# Patient Record
Sex: MALE | Race: WHITE | Employment: UNEMPLOYED | URBAN - METROPOLITAN AREA
[De-identification: names, ages, dates, MRNs, and addresses within clinical notes are randomized per-mention and may not be internally consistent; named-entity substitution may affect disease eponyms.]

---
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
## 2024-01-13 ENCOUNTER — HOSPITAL ENCOUNTER (EMERGENCY)
Facility: HOSPITAL | Age: 19
Discharge: HOME | End: 2024-01-13
Payer: MEDICAID

## 2024-01-13 ENCOUNTER — APPOINTMENT (OUTPATIENT)
Facility: HOSPITAL | Age: 19
End: 2024-01-13
Payer: MEDICAID

## 2024-01-13 VITALS
TEMPERATURE: 97.9 F | SYSTOLIC BLOOD PRESSURE: 146 MMHG | WEIGHT: 315 LBS | DIASTOLIC BLOOD PRESSURE: 92 MMHG | HEART RATE: 92 BPM | OXYGEN SATURATION: 100 % | RESPIRATION RATE: 16 BRPM

## 2024-01-13 DIAGNOSIS — M25.532 ACUTE WRIST PAIN, LEFT: Primary | ICD-10-CM

## 2024-01-13 PROCEDURE — 73110 X-RAY EXAM OF WRIST: CPT

## 2024-01-13 PROCEDURE — 99283 EMERGENCY DEPT VISIT LOW MDM: CPT

## 2024-01-13 ASSESSMENT — PAIN SCALES - GENERAL: PAINLEVEL_OUTOF10: 6

## 2024-01-17 ENCOUNTER — OFFICE VISIT (OUTPATIENT)
Age: 19
End: 2024-01-17
Payer: MEDICAID

## 2024-01-17 VITALS
WEIGHT: 315 LBS | HEIGHT: 67 IN | SYSTOLIC BLOOD PRESSURE: 129 MMHG | DIASTOLIC BLOOD PRESSURE: 69 MMHG | HEART RATE: 86 BPM | RESPIRATION RATE: 18 BRPM | BODY MASS INDEX: 49.44 KG/M2 | TEMPERATURE: 97.5 F | OXYGEN SATURATION: 98 %

## 2024-01-17 DIAGNOSIS — M25.532 LEFT WRIST PAIN: Primary | ICD-10-CM

## 2024-01-17 PROCEDURE — 99204 OFFICE O/P NEW MOD 45 MIN: CPT | Performed by: PHYSICIAN ASSISTANT

## 2024-01-17 ASSESSMENT — PATIENT HEALTH QUESTIONNAIRE - PHQ9
SUM OF ALL RESPONSES TO PHQ QUESTIONS 1-9: 0
1. LITTLE INTEREST OR PLEASURE IN DOING THINGS: 0
SUM OF ALL RESPONSES TO PHQ QUESTIONS 1-9: 0

## 2024-01-17 NOTE — PROGRESS NOTES
Room: 1B  I have reviewed all needed documentation in preparation for visit. Verified patient by name and date of birth  Chief Complaint   Patient presents with    New Patient    Wrist Pain     Left        Vitals:    01/17/24 0944   BP: 129/69   Site: Left Upper Arm   Position: Sitting   Cuff Size: Large Adult   Pulse: 86   Resp: 18   Temp: 97.5 °F (36.4 °C)   TempSrc: Oral   SpO2: 98%   Weight: (!) 152 kg (335 lb)   Height: 1.702 m (5' 7\")       No LMP for male patient.    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 I have asked the patient to contact his/her primary care provider (PCP) for follow-up on his/her health maintenance.    \"Have you been to the ER, urgent care clinic since your last visit?  Hospitalized since your last visit?\"    NO    “Have you seen or consulted any other health care providers outside of [facility] since your last visit?”    NO      
        Review of Systems:       General: Denies headache, lethargy, fever, weight loss  Ears/Nose/Throat: Denies ear discharge, drainage, nosebleeds, hoarse voice, dental problems  Cardiovascular: Denies chest pain, shortness of breath  Lungs: Denies chest pain, breathing problems, wheezing, pneumonia  Stomach: Denies stomach pain, heartburn, constipation, irritable bowel  Skin: Denies rash, sores, open wounds  Musculoskeletal: left hand -improving  Genitourinary: Denies dysuria, hematuria, polyuria  Gastrointestinal: Denies constipation, obstipation, diarrhea  Neurological: Denies changes in sight, smell, hearing, taste, seizures. Denies loss of consciousness.  Psychiatric: Denies depression, sleep pattern changes, anxiety, change in personality  Endocrine: Denies mood swings, heat or cold intolerance  Hematologic/Lymphatic: Denies anemia, purpura, petechia  Allergic/Immunologic: Denies swelling of throat, pain or swelling at lymph nodes      Physical Examination:    Vitals:    01/17/24 0944   BP: 129/69   Pulse: 86   Resp: 18   Temp: 97.5 °F (36.4 °C)   SpO2: 98%        General: AOX3, no apparent distress  Psychiatric: mood and affect appropriate  Lungs: breathing is symmetric and unlabored bilaterally  Heart: regular rate and rhythm  Abdomen: no guarding  Head: normocephalic, atraumatic  Skin: No significant abnormalities, good turgor  Sensation intact to light touch: C5-T1 dermatomes  Muscular exam: 5/5 strength in all major muscle groups unless noted in specialty exam.    Extremities      Left upper extremity: No gross deformity, swelling or edema. There is mild pain with active flexion of the wrist, otherwise No restriction to range of motion of the shoulder, elbow, wrist. Mild tenderness of the flexor tendons.  No tenderness to palpation of the forearm, carpals, hand or digitst. He is able to fully extend, flex and abduct fingers. FDP and FDS tested and are intact.  Neck range of motion is full and pain

## 2024-01-19 NOTE — ED PROVIDER NOTES
[facility] EMERGENCY DEPT  EMERGENCY DEPARTMENT ENCOUNTER         Pt Name: Jose Sultana  MRN: [id number]  Birthdate 2005  Date of evaluation: 1/13/2024  Provider: Riddhi Goldstein PA-C   PCP: Josemanuel Viramontes DO  Note Started: 9:28 PM EST 1/18/24     CHIEF COMPLAINT       Chief Complaint   Patient presents with    Wrist Pain     Pain on left wrist since helping his dad install a heavy barn door on Thursday. He is able to move, but it it painful and stiff. He has had it elevated and taking ibuprofen . Sometimes he feels like a jolt of pain left wrist. Good PSM,        HISTORY OF PRESENT ILLNESS: 1 or more elements      History From: Patient  HPI Limitations: None     Jose Sultana is a 18 y.o. male who presents ambulatory to the emergency department for evaluation of left wrist pain.  Patient states pain began on Thursday after helping his dad \"have a barn door\".  Patient denies decreased mobility, just states that it hurts when he moves it.  Patient has been taking ibuprofen at home, patient is in a wrist brace on initial evaluation.     Nursing Notes were all reviewed and agreed with or any disagreements were addressed in the HPI.     REVIEW OF SYSTEMS      Review of Systems     Positives and Pertinent negatives as per HPI.    PAST HISTORY     Past Medical History:  Past Medical History:   Diagnosis Date    Asthma     Strep pharyngitis 05/04/2017    Strep pharyngitis 11/18/2016    Strep pharyngitis 09/28/2016       Past Surgical History:  Past Surgical History:   Procedure Laterality Date    HEENT      tubes in bilat. ears    ORTHOPEDIC SURGERY      left elbow    TYMPANOSTOMY TUBE PLACEMENT         Family History:  Family History   Problem Relation Age of Onset    Psychiatric Disorder Mother     Diabetes Maternal Grandfather     Crohn's Disease Father     Psychiatric Disorder Father         anxiety    No Known Problems Sister     Kidney Disease Maternal Grandmother     No Known Problems Brother        Social

## 2024-02-23 ENCOUNTER — OFFICE VISIT (OUTPATIENT)
Age: 19
End: 2024-02-23
Payer: MEDICAID

## 2024-02-23 VITALS — HEIGHT: 67 IN | BODY MASS INDEX: 52.46 KG/M2

## 2024-02-23 DIAGNOSIS — M25.532 LEFT WRIST PAIN: Primary | ICD-10-CM

## 2024-02-23 PROCEDURE — 99212 OFFICE O/P EST SF 10 MIN: CPT | Performed by: PHYSICIAN ASSISTANT

## 2024-02-23 ASSESSMENT — PATIENT HEALTH QUESTIONNAIRE - PHQ9
SUM OF ALL RESPONSES TO PHQ QUESTIONS 1-9: 0
1. LITTLE INTEREST OR PLEASURE IN DOING THINGS: 0
SUM OF ALL RESPONSES TO PHQ QUESTIONS 1-9: 0
SUM OF ALL RESPONSES TO PHQ9 QUESTIONS 1 & 2: 0
2. FEELING DOWN, DEPRESSED OR HOPELESS: 0

## 2024-02-23 NOTE — PROGRESS NOTES
Identified pt with two pt identifiers (name and ). Reviewed chart in preparation for visit and have obtained necessary documentation.    Jose Sultana is a 18 y.o. male Pain (Left Wrist Pain )  .    Vitals:    24 1001   Height: 1.702 m (5' 7.01\")          1. Have you been to the ER, urgent care clinic since your last visit?  Hospitalized since your last visit?  no     2. Have you seen or consulted any other health care providers outside of the [facility] since your last visit?  Include any pap smears or colon screening.  no

## 2024-02-23 NOTE — PROGRESS NOTES
2/23/2024      CC: Left wrist pain    HPI:      This is a 18 y.o. year old male who is following up for left wrist pain. 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  His treatments so far have consisted of ibuprofen occasionally and a wrist immobilizer.  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 He states his pain has been improving mildly since his last visit a month ago but he recognizes he may not have been resting enough.  He denies any numbness, tingling, weakness, swelling or bruising.  He localizes pain most prominently over the dorsal and ulnar aspect of the wrist.        PMH:  Past Medical History:   Diagnosis Date    Asthma     Strep pharyngitis 05/04/2017    Strep pharyngitis 11/18/2016    Strep pharyngitis 09/28/2016       PSxHx:  Past Surgical History:   Procedure Laterality Date    HEENT      tubes in bilat. ears    ORTHOPEDIC SURGERY      left elbow    TYMPANOSTOMY TUBE PLACEMENT         Meds:  No current outpatient medications on file.    All:  Allergies   Allergen Reactions    Codeine Other (See Comments)     Hyper stay awake       Social Hx:  Social History     Socioeconomic History    Marital status: Single     Spouse name: None    Number of children: None    Years of education: None    Highest education level: None   Tobacco Use    Smoking status: Never     Passive exposure: Yes    Smokeless tobacco: Never   Substance and Sexual Activity    Alcohol use: No    Drug use: No       Family Hx:  Family History   Problem Relation Age of Onset    Psychiatric Disorder Mother     Diabetes Maternal Grandfather     Crohn's Disease Father     Psychiatric Disorder Father         anxiety    No Known Problems Sister     Kidney Disease Maternal Grandmother     No Known Problems Brother          Review of Systems:

## 2024-03-22 ENCOUNTER — OFFICE VISIT (OUTPATIENT)
Age: 19
End: 2024-03-22
Payer: MEDICAID

## 2024-03-22 VITALS
RESPIRATION RATE: 18 BRPM | WEIGHT: 315 LBS | BODY MASS INDEX: 49.44 KG/M2 | SYSTOLIC BLOOD PRESSURE: 122 MMHG | HEIGHT: 67 IN | OXYGEN SATURATION: 98 % | HEART RATE: 67 BPM | DIASTOLIC BLOOD PRESSURE: 81 MMHG

## 2024-03-22 DIAGNOSIS — M25.532 LEFT WRIST PAIN: Primary | ICD-10-CM

## 2024-03-22 PROCEDURE — 99212 OFFICE O/P EST SF 10 MIN: CPT | Performed by: PHYSICIAN ASSISTANT

## 2024-03-22 RX ORDER — DICLOFENAC SODIUM 75 MG/1
75 TABLET, DELAYED RELEASE ORAL 2 TIMES DAILY
Qty: 60 TABLET | Refills: 0 | Status: SHIPPED | OUTPATIENT
Start: 2024-03-22 | End: 2024-04-21

## 2024-03-22 ASSESSMENT — PATIENT HEALTH QUESTIONNAIRE - PHQ9
SUM OF ALL RESPONSES TO PHQ QUESTIONS 1-9: 0
SUM OF ALL RESPONSES TO PHQ QUESTIONS 1-9: 0
SUM OF ALL RESPONSES TO PHQ9 QUESTIONS 1 & 2: 0
1. LITTLE INTEREST OR PLEASURE IN DOING THINGS: NOT AT ALL
SUM OF ALL RESPONSES TO PHQ QUESTIONS 1-9: 0
2. FEELING DOWN, DEPRESSED OR HOPELESS: NOT AT ALL
SUM OF ALL RESPONSES TO PHQ QUESTIONS 1-9: 0

## 2024-03-22 NOTE — PROGRESS NOTES
Identified pt with two pt identifiers (name and ). Reviewed chart in preparation for visit and have obtained necessary documentation.    Jose Sultana is a 18 y.o. male Follow-up and Wrist Pain (4 weeks lt wrist ppain)  .    Vitals:    24 1002   BP: 122/81   Site: Right Upper Arm   Position: Sitting   Cuff Size: Large Adult   Pulse: 67   Resp: 18   SpO2: 98%   Weight: (!) 153.4 kg (338 lb 3.2 oz)   Height: 1.702 m (5' 7\")          1. Have you been to the ER, urgent care clinic since your last visit?  Hospitalized since your last visit?  no     2. Have you seen or consulted any other health care providers outside of the [facility] since your last visit?  Include any pap smears or colon screening.  no  
chest pain, shortness of breath  Lungs: Denies chest pain, breathing problems, wheezing, pneumonia  Stomach: Denies stomach pain, heartburn, constipation, irritable bowel  Skin: Denies rash, sores, open wounds  Musculoskeletal: left wrist pain  Genitourinary: Denies dysuria, hematuria, polyuria  Gastrointestinal: Denies constipation, obstipation, diarrhea  Neurological: Denies changes in sight, smell, hearing, taste, seizures. Denies loss of consciousness.  Psychiatric: Denies depression, sleep pattern changes, anxiety, change in personality  Endocrine: Denies mood swings, heat or cold intolerance  Hematologic/Lymphatic: Denies anemia, purpura, petechia  Allergic/Immunologic: Denies swelling of throat, pain or swelling at lymph nodes      Physical Examination:    Vitals:    03/22/24 1002   BP: 122/81   Pulse: 67   Resp: 18   SpO2: 98%        General: AOX3, no apparent distress  Psychiatric: mood and affect appropriate  Lungs: breathing is symmetric and unlabored bilaterally  Heart: regular rate and rhythm  Abdomen: no guarding  Head: normocephalic, atraumatic  Skin: No significant abnormalities, good turgor  Sensation intact to light touch: C5-T1 dermatomes  Muscular exam: 5/5 strength in all major muscle groups unless noted in specialty exam.    Extremities        Left upper extremity: No gross deformity, swelling or edema. There is mild pain with active flexion of the wrist, otherwise No restriction to range of motion of the shoulder, elbow, wrist. Mild tenderness of the flexor tendons.  No tenderness to palpation of the forearm, carpals, hand or digitst. He is able to fully extend, flex and abduct fingers. FDP and FDS tested and are intact.  Neck range of motion is full and pain free.  Muscle bulk is appropriate without wasting.  Sensation is intact to light touch in the C5-T1 dermatomes.  Sensation intact in all dermatones.  Capillary refill is less than 2 seconds in the fingers.  4+/5 strength with flexion of

## 2024-04-08 ENCOUNTER — HOSPITAL ENCOUNTER (OUTPATIENT)
Facility: HOSPITAL | Age: 19
Discharge: HOME | End: 2024-04-11
Payer: MEDICAID

## 2024-04-08 DIAGNOSIS — M25.532 LEFT WRIST PAIN: ICD-10-CM

## 2024-04-08 PROCEDURE — 73221 MRI JOINT UPR EXTREM W/O DYE: CPT

## 2024-04-11 ENCOUNTER — TELEPHONE (OUTPATIENT)
Age: 19
End: 2024-04-11

## 2024-04-12 ENCOUNTER — OFFICE VISIT (OUTPATIENT)
Age: 19
End: 2024-04-12
Payer: MEDICAID

## 2024-04-12 DIAGNOSIS — M92.212 OSTEOCHONDROSIS OF LUNATE OF LEFT WRIST: Primary | ICD-10-CM

## 2024-04-12 PROCEDURE — 99212 OFFICE O/P EST SF 10 MIN: CPT | Performed by: PHYSICIAN ASSISTANT
